# Patient Record
Sex: FEMALE | Race: AMERICAN INDIAN OR ALASKA NATIVE
[De-identification: names, ages, dates, MRNs, and addresses within clinical notes are randomized per-mention and may not be internally consistent; named-entity substitution may affect disease eponyms.]

---

## 2018-03-09 ENCOUNTER — HOSPITAL ENCOUNTER (INPATIENT)
Dept: HOSPITAL 31 - C.CATHLAB | Age: 68
LOS: 4 days | Discharge: ANOTHER HOSPITAL | DRG: 287 | End: 2018-03-13
Attending: INTERNAL MEDICINE | Admitting: INTERNAL MEDICINE
Payer: MEDICARE

## 2018-03-09 VITALS — BODY MASS INDEX: 25.2 KG/M2

## 2018-03-09 DIAGNOSIS — K21.9: ICD-10-CM

## 2018-03-09 DIAGNOSIS — E78.00: ICD-10-CM

## 2018-03-09 DIAGNOSIS — M06.9: ICD-10-CM

## 2018-03-09 DIAGNOSIS — I25.118: Primary | ICD-10-CM

## 2018-03-09 DIAGNOSIS — I10: ICD-10-CM

## 2018-03-09 LAB
CK MB SERPL-MCNC: 0.3 NG/ML (ref 0–3.38)
TROPONIN I SERPL-MCNC: 0.01 NG/ML (ref 0–0.12)

## 2018-03-09 PROCEDURE — B211YZZ FLUOROSCOPY OF MULTIPLE CORONARY ARTERIES USING OTHER CONTRAST: ICD-10-PCS | Performed by: SPECIALIST

## 2018-03-09 PROCEDURE — 4A023N7 MEASUREMENT OF CARDIAC SAMPLING AND PRESSURE, LEFT HEART, PERCUTANEOUS APPROACH: ICD-10-PCS | Performed by: SPECIALIST

## 2018-03-09 PROCEDURE — B215YZZ FLUOROSCOPY OF LEFT HEART USING OTHER CONTRAST: ICD-10-PCS | Performed by: SPECIALIST

## 2018-03-09 NOTE — CP.PCM.HP
Past Patient History





- Past Medical History & Family History


Past Medical History?: Yes





- Past Social History


Smoking Status: Never Smoked





- CARDIAC


Hx Cardiac Disorders: Yes


Hx Heart Attack: Yes


Hx Hypercholesterolemia: Yes


Hx Hypertension: Yes





- PULMONARY


Hx Respiratory Disorders: No





- NEUROLOGICAL


Hx Neurological Disorder: No





- HEENT


Hx HEENT Problems: No





- RENAL


Hx Chronic Kidney Disease: No





- ENDOCRINE/METABOLIC


Hx Endocrine Disorders: No


Hx Hyperthyroidism: Yes





- HEMATOLOGICAL/ONCOLOGICAL


Hx Blood Disorders: No





- INTEGUMENTARY


Hx Dermatological Problems: No





- MUSCULOSKELETAL/RHEUMATOLOGICAL


Hx Falls: No


Hx Osteoarthritis: Yes


Hx Rheumatoid Arthritis: Yes





- GASTROINTESTINAL


Hx Gastrointestinal Disorders: Yes


Hx Gastroesophageal Reflux: Yes


Other/Comment: HIATAL HERNIA





- GENITOURINARY/GYNECOLOGICAL


Hx Genitourinary Disorders: No





- PSYCHIATRIC


Hx Substance Use: No





- SURGICAL HISTORY


Hx Surgeries: Yes


Hx Cardiac Catheterization: Yes


Hx Coronary Stent: Yes (X2)


Other/Comment: ECTOPIC PREGNANCY





- ANESTHESIA


Hx Anesthesia: Yes


Hx Anesthesia Reactions: No


Hx Malignant Hyperthermia: No


Has any member of the family had a problem w/ anesthesia?: No





Meds


Allergies/Adverse Reactions: 


 Allergies











Allergy/AdvReac Type Severity Reaction Status Date / Time


 


No Known Allergies Allergy   Verified 12/03/15 09:00














Results





- Vital Signs


Recent Vital Signs: 





 Last Vital Signs











Temp  97.6 F   03/09/18 18:00


 


Pulse  56 L  03/09/18 18:00


 


Resp  20   03/09/18 18:00


 


BP  148/78   03/09/18 18:00


 


Pulse Ox  97   03/09/18 18:00

## 2018-03-10 LAB
ALBUMIN SERPL-MCNC: 4.1 G/DL (ref 3.5–5)
ALBUMIN/GLOB SERPL: 1.2 {RATIO} (ref 1–2.1)
ALT SERPL-CCNC: 25 U/L (ref 9–52)
AST SERPL-CCNC: 27 U/L (ref 14–36)
BASOPHILS # BLD AUTO: 0 K/UL (ref 0–0.2)
BASOPHILS NFR BLD: 0.5 % (ref 0–2)
BUN SERPL-MCNC: 13 MG/DL (ref 7–17)
CALCIUM SERPL-MCNC: 9.4 MG/DL (ref 8.6–10.4)
CK MB SERPL-MCNC: 0.27 NG/ML (ref 0–3.38)
CK MB SERPL-MCNC: 0.3 NG/ML (ref 0–3.38)
EOSINOPHIL # BLD AUTO: 0.1 K/UL (ref 0–0.7)
EOSINOPHIL NFR BLD: 2.6 % (ref 0–4)
ERYTHROCYTE [DISTWIDTH] IN BLOOD BY AUTOMATED COUNT: 12.8 % (ref 11.5–14.5)
GFR NON-AFRICAN AMERICAN: > 60
HDLC SERPL-MCNC: 67 MG/DL (ref 30–70)
HGB BLD-MCNC: 11.9 G/DL (ref 11–16)
LDLC SERPL-MCNC: 162 MG/DL (ref 0–129)
LYMPHOCYTES # BLD AUTO: 1.7 K/UL (ref 1–4.3)
LYMPHOCYTES NFR BLD AUTO: 34.1 % (ref 20–40)
MCH RBC QN AUTO: 25.5 PG (ref 27–31)
MCHC RBC AUTO-ENTMCNC: 32.2 G/DL (ref 33–37)
MCV RBC AUTO: 79.5 FL (ref 81–99)
MONOCYTES # BLD: 0.4 K/UL (ref 0–0.8)
MONOCYTES NFR BLD: 7.5 % (ref 0–10)
NEUTROPHILS # BLD: 2.7 K/UL (ref 1.8–7)
NEUTROPHILS NFR BLD AUTO: 55.3 % (ref 50–75)
NRBC BLD AUTO-RTO: 0.2 % (ref 0–2)
PLATELET # BLD: 157 K/UL (ref 130–400)
PMV BLD AUTO: 9.4 FL (ref 7.2–11.7)
RBC # BLD AUTO: 4.64 MIL/UL (ref 3.8–5.2)
WBC # BLD AUTO: 4.9 K/UL (ref 4.8–10.8)

## 2018-03-10 RX ADMIN — METOPROLOL SUCCINATE SCH MG: 25 TABLET, EXTENDED RELEASE ORAL at 10:15

## 2018-03-10 NOTE — CP.PCM.PN
Subjective





- Date & Time of Evaluation


Date of Evaluation: 03/10/18


Time of Evaluation: 10:30





- Subjective


Subjective: 


clinically same





Objective





- Vital Signs/Intake and Output


Vital Signs (last 24 hours): 


 











Temp Pulse Resp BP Pulse Ox


 


 98.2 F   65   20   129/79   98 


 


 03/10/18 15:00  03/10/18 15:00  03/10/18 15:00  03/10/18 15:00  03/10/18 15:00











- Medications


Medications: 


 Current Medications





Aspirin (Ecotrin)  81 mg PO DAILY [facility]


   Last Admin: 03/10/18 10:14 Dose:  81 mg


Ezetimibe (Zetia)  10 mg PO DAILY [facility]


   Last Admin: 03/10/18 10:24 Dose:  10 mg


Famotidine (Pepcid)  20 mg PO DAILY [facility]


   Last Admin: 03/10/18 10:15 Dose:  20 mg


Heparin Sodium (Porcine) (Heparin)  5,000 units SC Q12 [facility]


   Last Admin: 03/10/18 10:14 Dose:  5,000 units


Metoprolol Succinate (Toprol Xl)  25 mg PO DAILY [facility]


   Last Admin: 03/10/18 10:15 Dose:  25 mg


Pneumococcal Polyvalent Vaccine (Pneumovax 23 Vaccine)  0.5 ml IM .ONCE ONE


   Stop: 03/12/18 10:01


Rosuvastatin Calcium (Crestor)  40 mg PO DAILY [facility]


   Last Admin: 03/10/18 12:19 Dose:  40 mg











- Labs


Labs: 


 





 03/10/18 07:15 





 03/10/18 07:15 











- Constitutional


Appears: Well





- Head Exam


Head Exam: ATRAUMATIC, NORMAL INSPECTION, NORMOCEPHALIC





- Eye Exam


Eye Exam: EOMI, Normal appearance, PERRL


Pupil Exam: NORMAL ACCOMODATION, PERRL





- ENT Exam


ENT Exam: Mucous Membranes Moist, Normal Exam





- Neck Exam


Neck Exam: Full ROM, Normal Inspection.  absent: Lymphadenopathy





- Respiratory Exam


Respiratory Exam: Decreased Breath Sounds





- Cardiovascular Exam


Cardiovascular Exam: REGULAR RHYTHM, +S1, +S2





- GI/Abdominal Exam


GI & Abdominal Exam: Soft, Diminished Bowel Sounds





- Rectal Exam


Rectal Exam: Deferred





Assessment and Plan





- Assessment and Plan (Free Text)


Plan: 





Patient came in with the chest pain status post 90% LAD status post cardiology


Continue on heparin subcu twice daily


Metoprolol


Aspirin


Zetia


Negin


Follow-up with the cardiologist


As ordered

## 2018-03-10 NOTE — CP.PCM.CON
History of Present Illness





- History of Present Illness


History of Present Illness: 





CC/HPI: 67 F with hx of CAD s/p stents, HTN, Familial hyperlipidemia admitted 

after Cardiac cath due to critical CAD of Proximal LAD and distal main coronary 

artery.


Patient has h/o exertional angina and abnormal stress test.





Review of Systems





- Constitutional


Constitutional: As Per HPI.  absent: Chills





- EENT


Eyes: absent: Blurred Vision


Ears: absent: Decreased Hearing


Nose/Mouth/Throat: absent: Epistaxis





- Cardiovascular


Cardiovascular: Chest Pain





- Respiratory


Respiratory: Dyspnea





- Gastrointestinal


Gastrointestinal: absent: Abdominal Pain





- Genitourinary


Genitourinary: absent: Dysuria





- Musculoskeletal


Musculoskeletal: absent: Arthralgias





- Integumentary


Integumentary: absent: Alopecia





- Neurological


Neurological: absent: Abnormal Gait





Past Patient History





- Past Medical History & Family History


Past Medical History?: Yes





- Past Social History


Smoking Status: Never Smoked





- CARDIAC


Hx Cardiac Disorders: Yes


Hx Heart Attack: Yes


Hx Hypercholesterolemia: Yes


Hx Hypertension: Yes





- PULMONARY


Hx Respiratory Disorders: No





- NEUROLOGICAL


Hx Neurological Disorder: No





- HEENT


Hx HEENT Problems: No





- RENAL


Hx Chronic Kidney Disease: No





- ENDOCRINE/METABOLIC


Hx Endocrine Disorders: No


Hx Hyperthyroidism: Yes





- HEMATOLOGICAL/ONCOLOGICAL


Hx Blood Disorders: No





- INTEGUMENTARY


Hx Dermatological Problems: No





- MUSCULOSKELETAL/RHEUMATOLOGICAL


Hx Falls: No


Hx Osteoarthritis: Yes


Hx Rheumatoid Arthritis: Yes





- GASTROINTESTINAL


Hx Gastrointestinal Disorders: Yes


Hx Gastroesophageal Reflux: Yes


Other/Comment: HIATAL HERNIA





- GENITOURINARY/GYNECOLOGICAL


Hx Genitourinary Disorders: No





- PSYCHIATRIC


Hx Substance Use: No





- SURGICAL HISTORY


Hx Surgeries: Yes


Hx Cardiac Catheterization: Yes


Hx Coronary Stent: Yes (X2)


Other/Comment: ECTOPIC PREGNANCY





- ANESTHESIA


Hx Anesthesia: Yes


Hx Anesthesia Reactions: No


Hx Malignant Hyperthermia: No


Has any member of the family had a problem w/ anesthesia?: No





Meds


Allergies/Adverse Reactions: 


 Allergies











Allergy/AdvReac Type Severity Reaction Status Date / Time


 


No Known Allergies Allergy   Verified 12/03/15 09:00














- Medications


Medications: 


 Current Medications





Aspirin (Ecotrin)  81 mg PO DAILY [facility]


   Last Admin: 03/10/18 10:14 Dose:  81 mg


Ezetimibe (Zetia)  10 mg PO DAILY [facility]


   Last Admin: 03/10/18 10:24 Dose:  10 mg


Famotidine (Pepcid)  20 mg PO DAILY [facility]


   Last Admin: 03/10/18 10:15 Dose:  20 mg


Heparin Sodium (Porcine) (Heparin)  5,000 units SC Q12 [facility]


   Last Admin: 03/10/18 10:14 Dose:  5,000 units


Metoprolol Succinate (Toprol Xl)  25 mg PO DAILY [facility]


   Last Admin: 03/10/18 10:15 Dose:  25 mg


Pneumococcal Polyvalent Vaccine (Pneumovax 23 Vaccine)  0.5 ml IM .ONCE ONE


   Stop: 03/12/18 10:01


Rosuvastatin Calcium (Crestor)  40 mg PO DAILY [facility]


   Last Admin: 03/10/18 12:19 Dose:  40 mg











Physical Exam





- Constitutional


Appears: Well





- Eye Exam


Eye Exam: EOMI, Normal appearance, PERRL





- ENT Exam


ENT Exam: Mucous Membranes Moist, Normal Exam





- Neck Exam


Neck exam: Positive for: Normal Inspection





- Respiratory Exam


Respiratory Exam: Clear to Auscultation Bilateral, NORMAL BREATHING PATTERN





- Cardiovascular Exam


Cardiovascular Exam: REGULAR RHYTHM, +S1, +S2





- GI/Abdominal Exam


GI & Abdominal Exam: Normal Bowel Sounds, Soft





- Back Exam


Back exam: NORMAL INSPECTION





- Neurological Exam


Neurological exam: Alert, CN II-XII Intact, Oriented x3





- Psychiatric Exam


Psychiatric exam: Normal Affect, Normal Mood





- Skin


Skin Exam: Dry, Normal Color, Warm





Results





- Vital Signs


Recent Vital Signs: 


 Last Vital Signs











Temp  98.2 F   03/10/18 15:00


 


Pulse  65   03/10/18 15:00


 


Resp  20   03/10/18 15:00


 


BP  129/79   03/10/18 15:00


 


Pulse Ox  98   03/10/18 15:00














- Labs


Result Diagrams: 


 03/10/18 07:15





 03/10/18 07:15


Labs: 


 Laboratory Results - last 24 hr











  03/09/18 03/10/18 03/10/18





  22:42 07:15 07:15


 


WBC   4.9 


 


RBC   4.64 


 


Hgb   11.9 


 


Hct   36.9 


 


MCV   79.5 L 


 


MCH   25.5 L 


 


MCHC   32.2 L 


 


RDW   12.8 


 


Plt Count   157 


 


MPV   9.4 


 


Neut % (Auto)   55.3 


 


Lymph % (Auto)   34.1 


 


Mono % (Auto)   7.5 


 


Eos % (Auto)   2.6 


 


Baso % (Auto)   0.5 


 


Neut # (Auto)   2.7 


 


Lymph # (Auto)   1.7 


 


Mono # (Auto)   0.4 


 


Eos # (Auto)   0.1 


 


Baso # (Auto)   0.0 


 


Sodium    144


 


Potassium    4.4


 


Chloride    104


 


Carbon Dioxide    27


 


Anion Gap    17


 


BUN    13


 


Creatinine    0.9


 


Est GFR ( Amer)    > 60


 


Est GFR (Non-Af Amer)    > 60


 


Random Glucose    107 H


 


Calcium    9.4


 


Total Bilirubin    1.2


 


AST    27


 


ALT    25


 


Alkaline Phosphatase    59


 


Total Creatine Kinase  107  


 


CK-MB (Mass)  0.30  


 


Troponin I  0.0130  


 


Total Protein    7.7


 


Albumin    4.1


 


Globulin    3.6


 


Albumin/Globulin Ratio    1.2


 


Triglycerides    98  D


 


Cholesterol    315 H


 


LDL Cholesterol Direct    162 H


 


HDL Cholesterol    67


 


TSH 3rd Generation   














  03/10/18 03/10/18





  07:15 15:01


 


WBC  


 


RBC  


 


Hgb  


 


Hct  


 


MCV  


 


MCH  


 


MCHC  


 


RDW  


 


Plt Count  


 


MPV  


 


Neut % (Auto)  


 


Lymph % (Auto)  


 


Mono % (Auto)  


 


Eos % (Auto)  


 


Baso % (Auto)  


 


Neut # (Auto)  


 


Lymph # (Auto)  


 


Mono # (Auto)  


 


Eos # (Auto)  


 


Baso # (Auto)  


 


Sodium  


 


Potassium  


 


Chloride  


 


Carbon Dioxide  


 


Anion Gap  


 


BUN  


 


Creatinine  


 


Est GFR ( Amer)  


 


Est GFR (Non-Af Amer)  


 


Random Glucose  


 


Calcium  


 


Total Bilirubin  


 


AST  


 


ALT  


 


Alkaline Phosphatase  


 


Total Creatine Kinase  112  100


 


CK-MB (Mass)  0.27  0.30


 


Troponin I  < 0.0120  < 0.0120


 


Total Protein  


 


Albumin  


 


Globulin  


 


Albumin/Globulin Ratio  


 


Triglycerides  


 


Cholesterol  


 


LDL Cholesterol Direct  


 


HDL Cholesterol  


 


TSH 3rd Generation  0.70 














Assessment & Plan





- Assessment and Plan (Free Text)


Assessment: 





1. Critical CAD (Distal Left Main 50% and Proximal LAD 90 to 95% lesions)


2. Familial hyperlipidemia with severely elevated LAD despite maximal dose of 

Crestor and Zetia


3. HTN


4, Exertional angina and Abnormal stress test








Patient to be scheduled for High risk distal L Main and proximal LAD 

intervention


Due to the high risk nature of the intervention patient will likely require 

Impella Heart pump assist


Needs open heart surgery facility back up and not a candidate for [facility]


[place] [facility] [place] for the procedure pending insurance 

approval

## 2018-03-10 NOTE — CARDCATH
PROCEDURE DATE:



I was asked by Dr. Cornejo to perform cardiac catheterization, which was

stated as an outpatient by Dr. Cornejo, as Dr. Cornejo had some unexpected

emergency.  I did evaluate the patient in the cath lab before the procedure

and a recent consult was obtained.  The procedure and its risks were

thoroughly explained to the patient, understood and agreed for the

procedure.



PROCEDURE:  After local infiltration with 1% lidocaine, a 6-Cuban sheath

was placed in the right femoral artery.  Left and right coronary

angiography was performed with 6-Cuban JL4 and JR4 diagnostic catheters. 

Left ventriculogram was performed with a 6-Cuban pigtail catheter.  The

patient tolerated the procedure well without any complications.



ANGIOGRAPHIC FINDINGS:  Selective injection of the left coronary artery

revealed practically separate origins of the LAD and a very small

circumflex artery.  That large LAD had 50% stenosis along its entire mid

and distal segments before it bifurcated into large LAD and large diagonal

branch.  In my judgement, this is considered as a left main equivalent. 

The LAD had 80% stenosis in proximal to mid segment, and the rest of the

left coronary circulation was angiographically unremarkable.  Selective

injection of the right coronary artery revealed a medium-sized codominant

vessel.  _____ entire proximal and mid RCA were angiographically

unremarkable.  Left ventriculogram performed in the FAUSTIN projection revealed

normal wall motion; overall ejection fraction estimated at 65%.



CONCLUSION:  A 50% proximal LAD disease, which is considered left main

equivalent, followed by 80% mid LAD and normal left ventricular systolic

function.



RECOMMENDATIONS:  The patient will be admitted to telemetry and would be

further evaluated by Dr. Cornejo.





__________________________________________

Daryl Sharp MD





DD:  03/09/2018 14:01:03

DT:  03/09/2018 22:13:20

Job # [id number]

## 2018-03-11 RX ADMIN — METOPROLOL SUCCINATE SCH MG: 25 TABLET, EXTENDED RELEASE ORAL at 09:23

## 2018-03-11 NOTE — CARD
--------------- APPROVED REPORT --------------





EXAM: Two-dimensional and M-mode echocardiogram with Doppler and 

color Doppler.



Other Information 

Quality : GoodRhythm : 



INDICATION

Cardiac Disease: CAD CORONARY STENT



RISK FACTORS

Hypertension 

Hyperlipidemia



2D DIMENSIONS 

IVSd0.8   (0.7-1.1cm)LVDd4.1   (3.9-5.9cm)

PWd1.0   (0.7-1.1cm)LVDs2.7   (2.5-4.0cm)

FS (%) 35.0   %LVEF (%)64.8   (>50%)



M-Mode DIMENSIONS 

RVDd1.68   (2.1-3.2cm)Left Atrium (MM)3.44   (2.5-4.0cm)

IVSd0.88   (0.7-1.1cm)Aortic Root3.00   (2.2-3.7cm)

LVDd5.00   (4.0-5.6cm)Aortic Cusp Exc.2.09   (1.5-2.0cm)

PWd0.90   (0.7-1.1cm)FS (%) 51   %

LVDs2.47   (2.0-3.8cm)



Mitral Valve

MV E Bdisguhu03.3cm/sMV A Zvbtgjhx69.7cm/sE/A ratio0.8



TDI

E/Lateral E'0.0E/Medial E'0.0



Tricuspid Valve

TR Peak [id number]/sTR Peak [id number]



 LEFT VENTRICLE 

The left ventricle is normal size.

There is normal left ventricular wall thickness.

Left ventricle systolic function is normal. The Ejection Fraction is 

60-65%.

There is normal LV segmental wall motion.

The left ventricular diastolic function is abnormal.

Transmitral Doppler flow pattern is Grade I-abnormal relaxation 

pattern.

No left ventricle thrombus noted on this study.



 RIGHT VENTRICLE 

The right ventricle is normal size.

The right ventricular systolic function is normal.



 ATRIA 

The left atrium size is normal.

The right atrium size is normal.



 AORTIC VALVE 

The aortic valve is mildly sclerotic.

The aortic valve is trileaflet.

No aortic regurgitation is present.

There is no aortic valvular stenosis. 

There is no aortic valvular vegetation.



 MITRAL VALVE 

Mitral annular calcification is mild.

There is no evidence of mitral valve prolapse.

There is no mitral valve stenosis.

There is no mitral valve regurgitation noted.



 TRICUSPID VALVE 

The tricuspid valve is normal in structure.

There is mild tricuspid regurgitation.

Right ventricular systolic pressure is estimated at 30-40 mmHg. 

There is no pulmonary hypertension.

There is no tricuspid valve prolapse or vegetation.

There is no tricuspid valve stenosis. 



 PULMONIC VALVE 

The pulmonic valve is not well visualized.



 GREAT VESSELS 

The aortic root is normal in size.

The IVC is normal in size and collapses >50% with inspiration.



 PERICARDIAL EFFUSION 

There is no pericardial effusion.

There is no pleural effusion.



<Conclusion>

The left ventricle is normal size.

Left ventricle systolic function is normal. The Ejection Fraction is 

60-65%.

The left ventricular diastolic function is abnormal.

Transmitral Doppler flow pattern is Grade I-abnormal relaxation 

pattern.

The right ventricle is normal size.

The right ventricular systolic function is normal.

The left atrium size is normal.

The right atrium size is normal.

There is mild tricuspid regurgitation.

## 2018-03-11 NOTE — CP.PCM.PN
Subjective





- Date & Time of Evaluation


Date of Evaluation: 03/11/18


Time of Evaluation: 13:00





- Subjective


Subjective: 





Patient seen and evaluated


Denies chest pain and dyspnea





Awaiting insurance approval for transfer


Add plavix for today





Objective





- Vital Signs/Intake and Output


Vital Signs (last 24 hours): 


 











Temp Pulse Resp BP Pulse Ox


 


 98.1 F   70   20   120/85   98 


 


 03/11/18 15:00  03/11/18 15:00  03/11/18 15:00  03/11/18 15:00  03/11/18 15:00








Intake and Output: 


 











 03/11/18 03/11/18





 06:59 18:59


 


Intake Total  


 


Balance  














- Medications


Medications: 


 Current Medications





Aspirin (Ecotrin)  81 mg PO DAILY [facility]


   Last Admin: 03/11/18 09:23 Dose:  81 mg


Clopidogrel Bisulfate (Plavix)  75 mg PO DAILY [facility]


   Last Admin: 03/11/18 13:18 Dose:  75 mg


Ezetimibe (Zetia)  10 mg PO DAILY [facility]


   Last Admin: 03/11/18 09:26 Dose:  10 mg


Famotidine (Pepcid)  20 mg PO DAILY [facility]


   Last Admin: 03/11/18 09:23 Dose:  20 mg


Heparin Sodium (Porcine) (Heparin)  5,000 units SC Q12 [facility]


   Last Admin: 03/11/18 09:25 Dose:  5,000 units


Metoprolol Succinate (Toprol Xl)  25 mg PO DAILY [facility]


   Last Admin: 03/11/18 09:23 Dose:  25 mg


Pneumococcal Polyvalent Vaccine (Pneumovax 23 Vaccine)  0.5 ml IM .ONCE ONE


   Stop: 03/12/18 10:01


Rosuvastatin Calcium (Crestor)  40 mg PO HS [facility]











- Labs


Labs: 


 





 03/10/18 07:15 





 03/10/18 07:15

## 2018-03-11 NOTE — CP.PCM.PN
Subjective





- Date & Time of Evaluation


Date of Evaluation: 03/11/18


Time of Evaluation: 11:40





- Subjective


Subjective: 


clinically same





Objective





- Vital Signs/Intake and Output


Vital Signs (last 24 hours): 


 











Temp Pulse Resp BP Pulse Ox


 


 98.4 F   72   20   117/56 L  95 


 


 03/11/18 07:00  03/11/18 07:40  03/11/18 07:00  03/11/18 07:00  03/11/18 07:00








Intake and Output: 


 











 03/11/18 03/11/18





 06:59 18:59


 


Intake Total  


 


Balance  














- Medications


Medications: 


 Current Medications





Aspirin (Ecotrin)  81 mg PO DAILY [facility]


   Last Admin: 03/11/18 09:23 Dose:  81 mg


Clopidogrel Bisulfate (Plavix)  75 mg PO DAILY [facility]


   Last Admin: 03/11/18 13:18 Dose:  75 mg


Ezetimibe (Zetia)  10 mg PO DAILY [facility]


   Last Admin: 03/11/18 09:26 Dose:  10 mg


Famotidine (Pepcid)  20 mg PO DAILY [facility]


   Last Admin: 03/11/18 09:23 Dose:  20 mg


Heparin Sodium (Porcine) (Heparin)  5,000 units SC Q12 [facility]


   Last Admin: 03/11/18 09:25 Dose:  5,000 units


Metoprolol Succinate (Toprol Xl)  25 mg PO DAILY [facility]


   Last Admin: 03/11/18 09:23 Dose:  25 mg


Pneumococcal Polyvalent Vaccine (Pneumovax 23 Vaccine)  0.5 ml IM .ONCE ONE


   Stop: 03/12/18 10:01


Rosuvastatin Calcium (Crestor)  40 mg PO HS [facility]











- Labs


Labs: 


 





 03/10/18 07:15 





 03/10/18 07:15 











- Constitutional


Appears: Well





- Head Exam


Head Exam: ATRAUMATIC, NORMAL INSPECTION, NORMOCEPHALIC





- Eye Exam


Eye Exam: EOMI, Normal appearance, PERRL


Pupil Exam: NORMAL ACCOMODATION, PERRL





- ENT Exam


ENT Exam: Mucous Membranes Moist, Normal Exam





- Neck Exam


Neck Exam: Full ROM, Normal Inspection.  absent: Lymphadenopathy





- Respiratory Exam


Respiratory Exam: Decreased Breath Sounds





- Cardiovascular Exam


Cardiovascular Exam: REGULAR RHYTHM, +S1, +S2





- GI/Abdominal Exam


GI & Abdominal Exam: Soft.  absent: Diminished Bowel Sounds





- Rectal Exam


Rectal Exam: Deferred

## 2018-03-12 VITALS — RESPIRATION RATE: 20 BRPM

## 2018-03-12 LAB
ALBUMIN SERPL-MCNC: 4.1 G/DL (ref 3.5–5)
ALBUMIN/GLOB SERPL: 1.1 {RATIO} (ref 1–2.1)
ALT SERPL-CCNC: 23 U/L (ref 9–52)
APTT BLD: 29 SECONDS (ref 21–34)
AST SERPL-CCNC: 24 U/L (ref 14–36)
BASOPHILS # BLD AUTO: 0 K/UL (ref 0–0.2)
BASOPHILS NFR BLD: 0.5 % (ref 0–2)
BUN SERPL-MCNC: 13 MG/DL (ref 7–17)
CALCIUM SERPL-MCNC: 9.2 MG/DL (ref 8.6–10.4)
EOSINOPHIL # BLD AUTO: 0.2 K/UL (ref 0–0.7)
EOSINOPHIL NFR BLD: 2.7 % (ref 0–4)
ERYTHROCYTE [DISTWIDTH] IN BLOOD BY AUTOMATED COUNT: 12.5 % (ref 11.5–14.5)
GFR NON-AFRICAN AMERICAN: > 60
HGB BLD-MCNC: 11.2 G/DL (ref 11–16)
INR PPP: 1
LYMPHOCYTES # BLD AUTO: 1.9 K/UL (ref 1–4.3)
LYMPHOCYTES NFR BLD AUTO: 30.7 % (ref 20–40)
MCH RBC QN AUTO: 25 PG (ref 27–31)
MCHC RBC AUTO-ENTMCNC: 31.6 G/DL (ref 33–37)
MCV RBC AUTO: 79 FL (ref 81–99)
MONOCYTES # BLD: 0.5 K/UL (ref 0–0.8)
MONOCYTES NFR BLD: 7.5 % (ref 0–10)
NEUTROPHILS # BLD: 3.7 K/UL (ref 1.8–7)
NEUTROPHILS NFR BLD AUTO: 58.6 % (ref 50–75)
NRBC BLD AUTO-RTO: 0.1 % (ref 0–2)
PLATELET # BLD: 164 K/UL (ref 130–400)
PMV BLD AUTO: 9.1 FL (ref 7.2–11.7)
PROTHROMBIN TIME: 10.9 SECONDS (ref 9.7–12.2)
RBC # BLD AUTO: 4.48 MIL/UL (ref 3.8–5.2)
WBC # BLD AUTO: 6.2 K/UL (ref 4.8–10.8)

## 2018-03-12 RX ADMIN — METOPROLOL SUCCINATE SCH MG: 25 TABLET, EXTENDED RELEASE ORAL at 10:34

## 2018-03-12 NOTE — CP.PCM.PN
Subjective





- Date & Time of Evaluation


Date of Evaluation: 03/12/18


Time of Evaluation: 19:40





- Subjective


Subjective: 





Patient seen and evaluated


For L Main PCI tomorrow in am with Impella heart pump support





Objective





- Vital Signs/Intake and Output


Vital Signs (last 24 hours): 


 











Temp Pulse Resp BP Pulse Ox


 


 98 F   77   20   119/74   100 


 


 03/12/18 15:42  03/12/18 15:42  03/12/18 15:42  03/12/18 15:42  03/12/18 15:42











- Medications


Medications: 


 Current Medications





Aspirin (Ecotrin)  81 mg PO DAILY [facility]


   Last Admin: 03/12/18 10:34 Dose:  81 mg


Clopidogrel Bisulfate (Plavix)  75 mg PO DAILY [facility]


   Last Admin: 03/12/18 10:34 Dose:  75 mg


Ezetimibe (Zetia)  10 mg PO DAILY [facility]


   Last Admin: 03/12/18 10:34 Dose:  10 mg


Famotidine (Pepcid)  20 mg PO DAILY [facility]


   Last Admin: 03/12/18 10:34 Dose:  20 mg


Metoprolol Succinate (Toprol Xl)  25 mg PO DAILY [facility]


   Last Admin: 03/12/18 10:34 Dose:  25 mg


Rosuvastatin Calcium (Crestor)  40 mg PO HS [facility]


   Last Admin: 03/12/18 22:16 Dose:  40 mg











- Labs


Labs: 


 





 03/12/18 21:47 





 03/12/18 21:47 





 











PT  10.9 SECONDS (9.7-12.2)   03/12/18  21:47    


 


INR  1.0   03/12/18  21:47    


 


APTT  29 SECONDS (21-34)   03/12/18  21:47

## 2018-03-12 NOTE — CP.PCM.PN
Subjective





- Date & Time of Evaluation


Date of Evaluation: 03/12/18


Time of Evaluation: 12:20





- Subjective


Subjective: 


clinically same





Objective





- Vital Signs/Intake and Output


Vital Signs (last 24 hours): 


 











Temp Pulse Resp BP Pulse Ox


 


 98 F   77   20   119/74   100 


 


 03/12/18 15:42  03/12/18 15:42  03/12/18 15:42  03/12/18 15:42  03/12/18 15:42








Intake and Output: 


 











 03/12/18 03/12/18





 06:59 18:59


 


Intake Total 100 


 


Balance 100 














- Medications


Medications: 


 Current Medications





Aspirin (Ecotrin)  81 mg PO DAILY [facility]


   Last Admin: 03/12/18 10:34 Dose:  81 mg


Clopidogrel Bisulfate (Plavix)  75 mg PO DAILY [facility]


   Last Admin: 03/12/18 10:34 Dose:  75 mg


Ezetimibe (Zetia)  10 mg PO DAILY [facility]


   Last Admin: 03/12/18 10:34 Dose:  10 mg


Famotidine (Pepcid)  20 mg PO DAILY [facility]


   Last Admin: 03/12/18 10:34 Dose:  20 mg


Heparin Sodium (Porcine) (Heparin)  5,000 units SC Q12 [facility]


   Last Admin: 03/12/18 10:35 Dose:  5,000 units


Metoprolol Succinate (Toprol Xl)  25 mg PO DAILY [facility]


   Last Admin: 03/12/18 10:34 Dose:  25 mg


Rosuvastatin Calcium (Crestor)  40 mg PO HS [facility]


   Last Admin: 03/11/18 21:23 Dose:  40 mg











- Labs


Labs: 


 





 03/10/18 07:15 





 03/10/18 07:15 











- Constitutional


Appears: Well





- Head Exam


Head Exam: ATRAUMATIC, NORMAL INSPECTION, NORMOCEPHALIC





- Eye Exam


Eye Exam: EOMI, Normal appearance, PERRL


Pupil Exam: NORMAL ACCOMODATION, PERRL





- ENT Exam


ENT Exam: Mucous Membranes Moist, Normal Exam





- Neck Exam


Neck Exam: Full ROM, Normal Inspection.  absent: Lymphadenopathy





- Respiratory Exam


Respiratory Exam: Decreased Breath Sounds





- Cardiovascular Exam


Cardiovascular Exam: REGULAR RHYTHM, +S1, +S2





- GI/Abdominal Exam


GI & Abdominal Exam: Soft, Diminished Bowel Sounds





- Rectal Exam


Rectal Exam: Deferred





Assessment and Plan





- Assessment and Plan (Free Text)


Plan: 





[facility] cardiac 

intervention most likely bypass


Discussed with Dr. Cornejo in


Patient to be transferred as early as possible


Continue aspirin rosuvastatin continue Plavix

## 2018-03-13 VITALS — TEMPERATURE: 97.9 F | SYSTOLIC BLOOD PRESSURE: 131 MMHG | DIASTOLIC BLOOD PRESSURE: 74 MMHG | OXYGEN SATURATION: 98 %

## 2018-03-13 VITALS — HEART RATE: 65 BPM

## 2018-03-13 RX ADMIN — METOPROLOL SUCCINATE SCH MG: 25 TABLET, EXTENDED RELEASE ORAL at 06:53

## 2019-04-27 ENCOUNTER — HOSPITAL ENCOUNTER (EMERGENCY)
Dept: HOSPITAL 42 - ED | Age: 69
LOS: 1 days | Discharge: HOME | End: 2019-04-28
Payer: COMMERCIAL

## 2019-04-27 VITALS — BODY MASS INDEX: 25.2 KG/M2

## 2019-04-27 VITALS — TEMPERATURE: 98.4 F
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
## 2019-04-27 DIAGNOSIS — S63.502A: Primary | ICD-10-CM
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
## 2019-04-28 VITALS
DIASTOLIC BLOOD PRESSURE: 82 MMHG | RESPIRATION RATE: 18 BRPM | SYSTOLIC BLOOD PRESSURE: 147 MMHG | OXYGEN SATURATION: 100 % | HEART RATE: 65 BPM

## 2019-04-28 NOTE — RAD
Date of service: 



04/27/2019



PROCEDURE:  Left Wrist Radiographs.







HISTORY:

pain



COMPARISON:

None.



TECHNIQUE:

4 views obtained.



FINDINGS:



BONES:

No acute fracture. 



JOINTS:

Unremarkable.



SOFT TISSUES:

Calcification in the triangle fibrocartilage complex with small 

amount of soft tissue swelling. 



OTHER FINDINGS:

None.



IMPRESSION:

No demonstrated fracture or dislocation.



Soft tissue swelling in the region of the ulnar styloid.  

Calcification in the triangular fibrocartilage complex, likely 

representing chondrocalcinosis.

## 2019-04-28 NOTE — ED PDOC
Arrival/HPI





- General


Chief Complaint: Finger,Hand,&Wrist


Time Seen by Provider: 04/27/19 22:55


Historian: Patient





- History of Present Illness


Narrative History of Present Illness (Text): 





04/28/19 00:20


69 yo F complaining of left wrist pain and swelling which started today after 

she went to the grocery store and tried to push a shopping cart, states that the

shopping cart's wheels got jammed and she pushed the cart with both of her hands

and then developed pain and swelling to the left wrist.  Otherwise reports no 

fever, chills, numbness, other joint pain.  Patient has no additional 

complaints.





Past Medical History





- Infectious Disease


Hx of Infectious Diseases: None





- Reproductive


Menopause: Yes





- Cardiac


Hx Cardiac Disorders: Yes


Hx MI: Yes


Hx Hypertension: Yes





- Pulmonary


Hx Respiratory Disorders: No





- Neurological


Hx Neurological Disorder: No





- HEENT


Hx HEENT Disorder: No





- Renal


Hx Renal Disorder: No





- Endocrine/Metabolic


Hx Endocrine Disorders: No


Hx Hyperthyroidism: Yes





- Hematological/Oncological


Hx Blood Disorders: No





- Integumentary


Hx Dermatological Disorder: No





- Musculoskeletal/Rheumatological


Hx Falls: No


Hx Osteoarthritis: Yes


Hx Rheumatoid Arthritis: Yes





- Gastrointestinal


Hx Gastrointestinal Disorders: Yes


Hx Gastroesophageal Reflux: Yes


Other/Comment: HIATAL HERNIA





- Genitourinary/Gynecological


Hx Genitourinary Disorders: No





- Psychiatric


Hx Psychophysiologic Disorder: No


Hx Substance Use: No





- Surgical History


Hx Cardiac Catheterization: Yes


Hx Coronary Stent: Yes (X2)


Other/Comment: ECTOPIC PREGNANCY





- Anesthesia


Hx Anesthesia: Yes


Hx Anesthesia Reactions: No


Hx Malignant Hyperthermia: No





Family/Social History


Family/Social History: No Known Family HX


Smoking Status: Never Smoked


Hx Alcohol Use: No


Hx Substance Use: No





Allergies/Home Meds


Allergies/Adverse Reactions: 


Allergies





No Known Allergies Allergy (Verified 04/27/19 23:10)


   








Home Medications: 


                                    Home Meds











 Medication  Instructions  Recorded  Confirmed


 


Metoprolol Succinate XL [Toprol XL] 10 mg PO DAILY 12/03/15 04/27/19


 


Rosuvastatin Calcium [Crestor] 40 mg PO DAILY 12/03/15 04/27/19


 


Aspirin [Ecotrin] 81 mg PO DAILY 03/07/18 04/27/19


 


Atorvastatin [Lipitor] 10 mg PO HS 04/27/19 04/27/19


 


Clopidogrel [Plavix] 75 mg PO DAILY 04/27/19 04/27/19














Review of Systems





- Review of Systems


Constitutional: absent: Fatigue, Fevers


Musculoskeletal: Arthralgias, Joint Swelling.  absent: Back Pain, Neck Pain


Skin: absent: Rash, Pruritis, Skin Lesions


Neurological: absent: Headache, Dizziness





Physical Exam





Vital Signs











  Temp Pulse Resp BP Pulse Ox


 


 04/27/19 23:08  98.4 F  62  16  147/83  98











Temperature: Afebrile


Blood Pressure: Normal


Pulse: Regular


Respiratory Rate: Normal


Appearance: Positive for: Well-Appearing, Non-Toxic, Comfortable


Pain Distress: Mild


Mental Status: Positive for: Alert and Oriented X 3





- Systems Exam


Upper Extremity: Present: NORMAL PULSES, Tenderness (+tenderness to the L wrist 

with swelling noted), Swelling (to the left wrist), Neurovascularly Intact, 

Capillary Refill < 2s, Norm 2-Pt Discrimination, Other (remaining joints to the 

UE is non-tender with no swelling and +FROM).  No: Erythema, Temperature 

Abnormalties, Deformity


Neurological: Present: GCS=15, CN II-XII Intact, Speech Normal, Motor Func G

rossly Intact, Normal Sensory Function


Skin: Present: Warm, Dry, Normal Color.  No: Rashes


Psychiatric: Present: Alert, Oriented x 3, Normal Insight, Normal Concentration





Medical Decision Making


ED Course and Treatment: 





04/28/19 00:18


XR L wrist : no fracture, no dislocation. 


Patient advised that official radiology read of XR is still pending and will 

call the patient if there is any discrepancy within 24 hours.





X-ray results discussed with the patient.  Diagnosis of wrist sprain discussed 

with the patient.  Advised to keep wrist elevated.  Ortho-Glass volar splint 

applied to the left wrist.





Advised to follow up with primary care physician or ortho referral provided in 

1-2 days without fail. Return to the emergency room at any time for any new or 

worsening symptoms.





Patient states she fully agrees with and understands discharge instructions. 

States that she agrees with the plan and disposition. Verbalized and repeated 

discharge instructions and plan. I have given the patient opportunity to ask any

 additional questions.











- RAD Interpretation


Radiology Orders: 











04/27/19 23:30


WRIST, LEFT 3 VIEWS [RAD] Stat 














- PA / NP / Resident Statement


MD/DO has reviewed & agrees with the documentation as recorded.





Disposition/Present on Arrival





- Present on Arrival


Any Indicators Present on Arrival: No


History of DVT/PE: No


History of Uncontrolled Diabetes: No


Urinary Catheter: No


History of Decub. Ulcer: No


History Surgical Site Infection Following: None





- Disposition


Have Diagnosis and Disposition been Completed?: Yes


Diagnosis: 


 Left wrist sprain





Disposition: HOME/ ROUTINE


Disposition Time: 00:15


Patient Plan: Discharge


Patient Problems: 


                             Current Active Problems











Problem Status Onset


 


Left wrist sprain Acute 











Condition: STABLE


Discharge Instructions (ExitCare):  Wrist Sprain (DC)


Additional Instructions: 


Thank you for letting us take care of you today. You were treated for left wrist

 sprain. The emergency medical care you received today was directed at your 

acute symptoms.  Keep wrist elevated, take Tylenol for pain. It may take several

 days for your symptoms to resolve. Return to the Emergency Department if your 

symptoms worsen, do not improve, or if you have any other problems.





Please contact your doctor in 2 days for re-evaluation and follow up / or call 

one of the physicians/clinics you have been referred to that are listed on the 

Patient Visit Information form that is included in your discharge packet. Bring 

any paperwork you were given at discharge with you along with any medications 

you are taking to your follow up visit. Our treatment cannot replace ongoing 

medical care by a primary care provider (PCP) outside of the emergency 

department.





Thank you for allowing the [facility] team to be part of your care today.








If you had an X-Ray : A Radiologist will review the ED reading if any change in 

treatment is needed we will contact you.***





Referrals: 


Dima Armstrong DO [Staff Provider] - Follow up with primary


Forms:  [employer] (English)